# Patient Record
(demographics unavailable — no encounter records)

---

## 2025-02-10 NOTE — COUNSELING
[Nutrition/ Exercise/ Weight Management] : nutrition, exercise, weight management [Vitamins/Supplements] : vitamins/supplements [Medication Management] : medication management [FreeTextEntry2] : Vulvar hygiene

## 2025-02-10 NOTE — PHYSICAL EXAM
[Appropriately responsive] : appropriately responsive [Alert] : alert [No Acute Distress] : no acute distress [No Lymphadenopathy] : no lymphadenopathy [Regular Rate Rhythm] : regular rate rhythm [No Murmurs] : no murmurs [Clear to Auscultation B/L] : clear to auscultation bilaterally [Soft] : soft [Non-tender] : non-tender [Non-distended] : non-distended [No HSM] : No HSM [No Lesions] : no lesions [No Mass] : no mass [Oriented x3] : oriented x3 [Examination Of The Breasts] : a normal appearance [No Masses] : no breast masses were palpable [Labia Majora] : normal [Labia Minora] : normal [Discharge] : a  ~M vaginal discharge was present [Scant] : scant [Foul Smelling] : not foul smelling [White] : white [Cheesy] : cheesy [Normal] : normal [Uterine Adnexae] : normal [No Tenderness] : no tenderness [Nl Sphincter Tone] : normal sphincter tone [FreeTextEntry4] : Vaginal discharge culture done [FreeTextEntry5] : Pap done [FreeTextEntry9] : Hemoccult negative

## 2025-02-10 NOTE — HISTORY OF PRESENT ILLNESS
[FreeTextEntry1] : Patient is a 39-year-old female who presents for a routine annual gynecologic examination.  Patient has complaints of a questionable ingrown hair in the left vulvar area and also a slight pinkish vaginal discharge.  Patient's last mammogram was March 2021.

## 2025-02-10 NOTE — DISCUSSION/SUMMARY
[FreeTextEntry1] : Impression: Vaginal discharge, vulvar boil, otherwise normal GYN exam  Rx: Ceftin 500 mg p.o. twice daily for 7 days, Terazol 3 suppositories-use as directed  Recommendations: Self breast exam, mammography, vitamin supplementation regular exercise, weight loss, vulvar hygiene  Instructions and precautions reviewed follow-up in 2 weeks and 1 year

## 2025-03-05 NOTE — HISTORY OF PRESENT ILLNESS
[FreeTextEntry1] : Patient is a 39-year-old female who presents for 2-week follow-up status post treatment for a left vulvar boil.  Patient was on 1 week of antibiotics Ceftin.  Patient states she feels much better and has improved.

## 2025-03-05 NOTE — PHYSICAL EXAM
[Labia Majora] : normal [Labia Minora] : normal [Normal] : normal [Uterine Adnexae] : normal [FreeTextEntry1] : Left vulvar boil resolved

## 2025-03-05 NOTE — DISCUSSION/SUMMARY
[FreeTextEntry1] : Impression: Left vulvar boil-resolved  Local care discussed.  Follow-up as needed and for routine GYN care